# Patient Record
Sex: FEMALE | Race: WHITE | NOT HISPANIC OR LATINO | URBAN - METROPOLITAN AREA
[De-identification: names, ages, dates, MRNs, and addresses within clinical notes are randomized per-mention and may not be internally consistent; named-entity substitution may affect disease eponyms.]

---

## 2017-01-25 ENCOUNTER — COMMUNICATION - HEALTHEAST (OUTPATIENT)
Dept: FAMILY MEDICINE | Facility: CLINIC | Age: 62
End: 2017-01-25

## 2017-07-13 ENCOUNTER — COMMUNICATION - HEALTHEAST (OUTPATIENT)
Dept: FAMILY MEDICINE | Facility: CLINIC | Age: 62
End: 2017-07-13

## 2017-07-14 ENCOUNTER — OFFICE VISIT - HEALTHEAST (OUTPATIENT)
Dept: FAMILY MEDICINE | Facility: CLINIC | Age: 62
End: 2017-07-14

## 2017-07-14 DIAGNOSIS — J32.9 CHRONIC SINUSITIS: ICD-10-CM

## 2017-07-14 DIAGNOSIS — R42 VERTIGO: ICD-10-CM

## 2017-07-14 DIAGNOSIS — E03.9 HYPOTHYROIDISM, UNSPECIFIED TYPE: ICD-10-CM

## 2017-07-14 DIAGNOSIS — E03.9 HYPOTHYROIDISM: ICD-10-CM

## 2017-07-14 ASSESSMENT — MIFFLIN-ST. JEOR: SCORE: 1208.13

## 2017-07-21 ENCOUNTER — OFFICE VISIT - HEALTHEAST (OUTPATIENT)
Dept: OCCUPATIONAL THERAPY | Facility: REHABILITATION | Age: 62
End: 2017-07-21

## 2017-07-21 DIAGNOSIS — R26.81 UNSTEADINESS ON FEET: ICD-10-CM

## 2017-07-21 DIAGNOSIS — H81.12 BPPV (BENIGN PAROXYSMAL POSITIONAL VERTIGO), LEFT: ICD-10-CM

## 2017-07-21 DIAGNOSIS — R42 DIZZINESS: ICD-10-CM

## 2017-08-01 ENCOUNTER — OFFICE VISIT - HEALTHEAST (OUTPATIENT)
Dept: OCCUPATIONAL THERAPY | Facility: REHABILITATION | Age: 62
End: 2017-08-01

## 2017-08-01 DIAGNOSIS — H81.12 BPPV (BENIGN PAROXYSMAL POSITIONAL VERTIGO), LEFT: ICD-10-CM

## 2017-08-01 DIAGNOSIS — R42 DIZZINESS: ICD-10-CM

## 2017-08-01 DIAGNOSIS — R26.81 UNSTEADINESS ON FEET: ICD-10-CM

## 2017-08-17 ENCOUNTER — OFFICE VISIT - HEALTHEAST (OUTPATIENT)
Dept: OCCUPATIONAL THERAPY | Facility: REHABILITATION | Age: 62
End: 2017-08-17

## 2017-08-17 DIAGNOSIS — H81.13 BPPV (BENIGN PAROXYSMAL POSITIONAL VERTIGO), BILATERAL: ICD-10-CM

## 2017-08-17 DIAGNOSIS — R26.81 UNSTEADINESS ON FEET: ICD-10-CM

## 2017-08-17 DIAGNOSIS — R42 DIZZINESS: ICD-10-CM

## 2017-08-27 ENCOUNTER — COMMUNICATION - HEALTHEAST (OUTPATIENT)
Dept: FAMILY MEDICINE | Facility: CLINIC | Age: 62
End: 2017-08-27

## 2017-08-27 DIAGNOSIS — E03.9 HYPOTHYROIDISM: ICD-10-CM

## 2018-02-07 ENCOUNTER — COMMUNICATION - HEALTHEAST (OUTPATIENT)
Dept: FAMILY MEDICINE | Facility: CLINIC | Age: 63
End: 2018-02-07

## 2018-04-09 ENCOUNTER — OFFICE VISIT - HEALTHEAST (OUTPATIENT)
Dept: FAMILY MEDICINE | Facility: CLINIC | Age: 63
End: 2018-04-09

## 2018-04-09 DIAGNOSIS — E03.9 HYPOTHYROIDISM, UNSPECIFIED TYPE: ICD-10-CM

## 2018-04-09 DIAGNOSIS — J02.9 SORE THROAT: ICD-10-CM

## 2018-04-09 DIAGNOSIS — Z12.31 VISIT FOR SCREENING MAMMOGRAM: ICD-10-CM

## 2018-04-09 DIAGNOSIS — J40 BRONCHITIS: ICD-10-CM

## 2018-04-09 LAB — DEPRECATED S PYO AG THROAT QL EIA: NORMAL

## 2018-04-09 ASSESSMENT — MIFFLIN-ST. JEOR: SCORE: 1245.26

## 2018-04-10 LAB
GROUP A STREP BY PCR: NORMAL
TSH SERPL DL<=0.005 MIU/L-ACNC: 3.25 UIU/ML (ref 0.3–5)

## 2018-04-11 ENCOUNTER — AMBULATORY - HEALTHEAST (OUTPATIENT)
Dept: FAMILY MEDICINE | Facility: CLINIC | Age: 63
End: 2018-04-11

## 2018-04-12 ENCOUNTER — COMMUNICATION - HEALTHEAST (OUTPATIENT)
Dept: FAMILY MEDICINE | Facility: CLINIC | Age: 63
End: 2018-04-12

## 2018-04-13 RX ORDER — VALACYCLOVIR HYDROCHLORIDE 1 G/1
TABLET, FILM COATED ORAL
Qty: 4 TABLET | Refills: 0 | Status: SHIPPED | OUTPATIENT
Start: 2018-04-13

## 2019-05-06 ENCOUNTER — COMMUNICATION - HEALTHEAST (OUTPATIENT)
Dept: FAMILY MEDICINE | Facility: CLINIC | Age: 64
End: 2019-05-06

## 2019-05-11 ENCOUNTER — COMMUNICATION - HEALTHEAST (OUTPATIENT)
Dept: FAMILY MEDICINE | Facility: CLINIC | Age: 64
End: 2019-05-11

## 2019-05-11 DIAGNOSIS — E03.9 HYPOTHYROIDISM, UNSPECIFIED TYPE: ICD-10-CM

## 2019-05-24 ENCOUNTER — COMMUNICATION - HEALTHEAST (OUTPATIENT)
Dept: FAMILY MEDICINE | Facility: CLINIC | Age: 64
End: 2019-05-24

## 2019-05-29 ENCOUNTER — OFFICE VISIT - HEALTHEAST (OUTPATIENT)
Dept: FAMILY MEDICINE | Facility: CLINIC | Age: 64
End: 2019-05-29

## 2019-05-29 DIAGNOSIS — Z12.31 VISIT FOR SCREENING MAMMOGRAM: ICD-10-CM

## 2019-05-29 DIAGNOSIS — Z00.00 ROUTINE GENERAL MEDICAL EXAMINATION AT A HEALTH CARE FACILITY: ICD-10-CM

## 2019-05-29 DIAGNOSIS — Z13.220 LIPID SCREENING: ICD-10-CM

## 2019-05-29 DIAGNOSIS — E03.9 HYPOTHYROIDISM, UNSPECIFIED TYPE: ICD-10-CM

## 2019-05-29 DIAGNOSIS — Z91.89 DES EXPOSURE IN UTERO: ICD-10-CM

## 2019-05-29 DIAGNOSIS — Z13.1 SCREENING FOR DIABETES MELLITUS: ICD-10-CM

## 2019-05-29 RX ORDER — LEVOTHYROXINE SODIUM 88 UG/1
TABLET ORAL
Qty: 90 TABLET | Refills: 3 | Status: SHIPPED | OUTPATIENT
Start: 2019-05-29

## 2019-05-29 RX ORDER — PIMECROLIMUS 10 MG/G
CREAM TOPICAL
Refills: 3 | Status: SHIPPED | COMMUNITY
Start: 2019-05-06

## 2019-05-29 ASSESSMENT — MIFFLIN-ST. JEOR: SCORE: 1196.79

## 2021-05-28 NOTE — TELEPHONE ENCOUNTER
LM for pt to call clinic back.    Pt is due for a physical and needs to schedule prior to the release of the prescription, per Dr. Greenberg.

## 2021-05-28 NOTE — TELEPHONE ENCOUNTER
Left 2nd message for patient to call back:  on main line to schedule her physical and pap.     Information to relay to patient: Please assist Pt in scheduling a physical / PAP with Dr. Heidy Greenberg.

## 2021-05-28 NOTE — TELEPHONE ENCOUNTER
RN cannot approve Refill Request    RN can NOT refill this medication PCP messaged that patient is overdue for Labs and Office Visit. Last office visit: 4/9/2018 Thomas Flores DO Last Physical: Visit date not found Last MTM visit: Visit date not found Last visit same specialty: 4/9/2018 Thomas Flores DO.  Next visit within 3 mo: Visit date not found  Next physical within 3 mo: Visit date not found      Kami Carranza, [facility] Triage/Med Refill 5/12/2019    Requested Prescriptions   Pending Prescriptions Disp Refills     levothyroxine (SYNTHROID, LEVOTHROID) 88 MCG tablet [Pharmacy Med Name: LEVOTHYROXINE 0.088MG (88MCG) TAB] 90 tablet 0     Sig: TAKE 1 TABLET(88 MCG) BY MOUTH DAILY       Thyroid Hormones Protocol Failed - 5/11/2019  3:32 AM        Failed - Provider visit in past 12 months or next 3 months     Last office visit with prescriber/PCP: 4/9/2018 Thomas Flores DO OR same dept: Visit date not found OR same specialty: 4/9/2018 Thomas Flores DO  Last physical: Visit date not found Last MTM visit: Visit date not found   Next visit within 3 mo: Visit date not found  Next physical within 3 mo: Visit date not found  Prescriber OR PCP: Thomas Ramírez DO  Last diagnosis associated with med order: There are no diagnoses linked to this encounter.  If protocol passes may refill for 12 months if within 3 months of last provider visit (or a total of 15 months).             Failed - TSH on file in past 12 months for patient age 12 & older     TSH   Date Value Ref Range Status   04/09/2018 3.25 0.30 - 5.00 uIU/mL Final

## 2021-05-28 NOTE — TELEPHONE ENCOUNTER
Left message to call back for: Lorenza     Information to relay to patient: Please assist pt in scheduling a physical with PAP.

## 2021-05-29 NOTE — PROGRESS NOTES
Assessment/Plan:     1. Routine general medical examination at a health care facility  Routine history and physical, updated in EMR.  Patient is not fasting today, wishes to return fasting for labs as below.  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  She will consider updating immunizations, will return for shingles vaccine after she checks with her insurance.  She will schedule her mammogram and colonoscopy.  Plan repeat physical in 1 year.    2. Hypothyroidism, unspecified type  Patient will return in 4 weeks for recheck of TSH.  She has been noncompliant recently with her medication.  - levothyroxine (SYNTHROID, LEVOTHROID) 88 MCG tablet; TAKE 1 TABLET(88 MCG) BY MOUTH DAILY  Dispense: 90 tablet; Refill: 3  - Thyroid Stimulating Hormone (TSH); Future    3. ANNALISE exposure in utero  Recommended continuing care with OB/GYN for routine screening Pap smears.    4. Lipid screening  Fasting lipid profile will be updated when patient returns.  - Lipid Cascade FASTING; Future    5. Screening for diabetes mellitus  Fasting glucose ordered for future.  Patient will return for this.  - Glucose; Future    6. Visit for screening mammogram  Patient was given information to schedule mammogram.  - Mammo Screening Bilateral; Future      Subjective:      Lorenza Jewell is a 64 y.o. female who presents for an annual exam. The patient is sexually active. The patient participates in regular exercise: yes. The patient reports that there is not domestic violence in her life.  Patient has been feeling well overall and has no particular questions or concerns today.  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  She has been getting [facility] with [facility] Holm.  She is due for refill of her levothyroxine but admits that she did not take this over the weekend, missed about 3 doses.    Healthy Habits:   Regular Exercise: Yes - Pilates, walks dog  Sunscreen Use: Yes  Healthy  "Diet: Yes  Dental Visits Regularly: Yes  Seat Belt: Yes  Sexually active: Yes  Self Breast Exam Monthly:No   Colonoscopy: per pt 5 year ago. Pt will get next one scheduled  Lipid Profile: No  Glucose Screen: No  Prevention of Osteoporosis: No  Last Dexa: Yes, \"a long time ago\"      Immunization History   Administered Date(s) Administered     Influenza, Seasonal, Inj PF IIV3 11/16/2007, 10/03/2013, 10/30/2016, 11/03/2017     Influenza, inj, historic,unspecified 10/03/2013, 10/24/2014     Influenza,seasonal quad, PF, 36+MOS 11/03/2015     Td,adult,historic,unspecified 01/01/2005     Immunization status: missing doses of Tdap and Shingrix.    No exam data present    Gynecologic History  No LMP recorded. Patient is postmenopausal.  Contraception: post menopausal status  Last Pap: unknown, >5 years ago per patient. Results were: normal  Last mammogram: unsure. Results were: normal      OB History   No data available       Current Outpatient Medications   Medication Sig Dispense Refill     FEXOFENADINE HCL (MUCINEX ALLERGY ORAL) Take by mouth daily.       levothyroxine (SYNTHROID, LEVOTHROID) 88 MCG tablet TAKE 1 TABLET(88 MCG) BY MOUTH DAILY 90 tablet 0     pimecrolimus (ELIDEL) 1 % cream LOKI TO FACE BID  3     valACYclovir (VALTREX) 1000 MG tablet TAKE 2 TABLET BY MOUTH TWICE DAILY 4 tablet 0     No current facility-administered medications for this visit.      No past medical history on file.  Past Surgical History:   Procedure Laterality Date     NE MYOMECTOMY 1-4,W/TOT 250GMS/<,ABD APPRCH      Description: Uterine Myomectomy;  Recorded: 10/04/2013;     Patient has no known allergies.  Family History   Problem Relation Age of Onset     No Medical Problems Mother      Prostate cancer Father      Skin cancer Father      Hypertension Father      Vertigo Father      Social History     Socioeconomic History     Marital status:      Spouse name: Not on file     Number of children: Not on file     Years of education: " "Not on file     Highest education level: Not on file   Occupational History     Not on file   Social Needs     Financial resource strain: Not on file     Food insecurity:     Worry: Not on file     Inability: Not on file     Transportation needs:     Medical: Not on file     Non-medical: Not on file   Tobacco Use     Smoking status: Never Smoker     Smokeless tobacco: Never Used   Substance and Sexual Activity     Alcohol use: Yes     Drug use: No     Sexual activity: Not on file   Lifestyle     Physical activity:     Days per week: Not on file     Minutes per session: Not on file     Stress: Not on file   Relationships     Social connections:     Talks on phone: Not on file     Gets together: Not on file     Attends [employer]ism service: Not on file     Active member of club or organization: Not on file     Attends meetings of clubs or organizations: Not on file     Relationship status: Not on file     Intimate partner violence:     Fear of current or ex partner: Not on file     Emotionally abused: Not on file     Physically abused: Not on file     Forced sexual activity: Not on file   Other Topics Concern     Not on file   Social History Narrative     Not on file       Review of Systems  General:  Denies problem  Eyes: Denies problem  Ears/Nose/Throat: Denies problem  Cardiovascular: Denies problem  Respiratory:  Denies problem  Gastrointestinal:  Denies problem  Genitourinary: Denies problem  Musculoskeletal:  Denies problem  Skin: Denies problem  Neurologic: Denies problem  Psychiatric: Denies problem  Endocrine: Denies problem  Heme/Lymphatic: Denies problem   Allergic/Immunologic: Denies problem        Objective:         Vitals:    05/29/19 0901   BP: 106/68   Pulse: 72   Resp: 16   Weight: 139 lb 4.8 oz (63.2 kg)   Height: 5' 6.2\" (1.681 m)     Body mass index is 22.35 kg/m .    Physical Exam:  General Appearance: Alert, cooperative, no distress, appears stated age  Head: Normocephalic, without obvious " abnormality, atraumatic  Eyes: PERRL, conjunctiva/corneas clear, EOM's intact  Ears: Normal TM's and external ear canals, both ears  Nose: Nares normal, septum midline, mucosa normal, no drainage  Throat: Lips, mucosa, and tongue normal; teeth and gums normal  Neck: Supple, symmetrical, trachea midline, no adenopathy; thyroid: not enlarged, symmetric, no tenderness/mass/nodules; no carotid bruit or JVD  Back: Symmetric, no curvature, ROM normal, no CVA tenderness  Lungs: Clear to auscultation bilaterally, respirations unlabored  Breasts: No breast masses, tenderness, asymmetry, or nipple discharge  Heart: Regular rate and rhythm, S1 and S2 normal, no murmur, rub, or gallop  Abdomen: Soft, non-tender, bowel sounds active all four quadrants, no masses, no organomegaly  Pelvic:Not examined  Extremities: Extremities normal, atraumatic, no cyanosis or edema  Skin: Skin color, texture, turgor normal, no rashes or lesions  Lymph nodes: Cervical, supraclavicular, and axillary nodes normal  Neurologic: Normal

## 2021-05-29 NOTE — TELEPHONE ENCOUNTER
Left message to call back for: Lorenza    Information to relay to patient: LMTCB    Please assist the patient with scheduling a physical when the patient calls back, the patient is overdue.  Thank you so much!    Catie CAAL CMA

## 2021-05-29 NOTE — TELEPHONE ENCOUNTER
2nd call    Left message to call back for: Lorenza      Information to relay to patient:  LMTCB    Please assist the patient with scheduling a physical when the patient calls back, the patient is overdue.  Thank you so much!    Catie CAAL CMA

## 2021-05-29 NOTE — TELEPHONE ENCOUNTER
Left 3rd message for patient to call back:  on main line to schedule her physical and pap.     Information to relay to patient: Please assist Pt in scheduling a physical / PAP with Dr. Heidy Greenberg.

## 2021-05-31 VITALS — HEIGHT: 66 IN | BODY MASS INDEX: 22.9 KG/M2 | WEIGHT: 142.5 LBS

## 2021-06-01 VITALS — HEIGHT: 68 IN | WEIGHT: 145.44 LBS | BODY MASS INDEX: 22.04 KG/M2

## 2021-06-03 VITALS — BODY MASS INDEX: 22.39 KG/M2 | HEIGHT: 66 IN | WEIGHT: 139.3 LBS

## 2021-06-11 NOTE — PROGRESS NOTES
Lorenza,    Thyroid is significantly elevated compared to before. I'm going to increase your Synthroid dose. Let's recheck thyroid test in about 6-8 weeks

## 2021-06-11 NOTE — PROGRESS NOTES
ASSESSMENT/ PLAN    1. Vertigo  In the setting of chronic sinusitis. [place] hallpike positive. Has an ongoing frontal headache. Most likely BPPV or vestibular migraine, chronic sinusitis can also contribute to this as well. I didn't appreciate central pathology for her complaints.  She appears otherwise well on examination today.  Will treat symptomatically with meclizine 3 times daily as needed and we will send her to the vestibular clinic for further therapy.  If not improving, can consider imaging of her sinuses as discussed below.  - meclizine (ANTIVERT) 12.5 mg tablet; Take 1 tablet (12.5 mg total) by mouth 3 (three) times a day as needed for dizziness or nausea.  Dispense: 30 tablet; Refill: 1  - Ambulatory referral to PT/OT    2. Chronic sinusitis  Reviewed PCP and ENT visit about a year ago.  I do not think she has any acute infectious symptoms at this point.  We will start her on the Flonase again for nasal congestion and facial pressure.  I will consider repeating CT sinuses and refer her back to ENT if vertigo and nasal congestions do not improve with conservative therapy.  - fluticasone (FLONASE) 50 mcg/actuation nasal spray; 1 spray into each nostril daily.  Dispense: 16 g; Refill: 0    3. Hypothyroidism  We will check thyroid enzyme today and refill Synthroid as appropriately.  - Thyroid Maynard    TSH significantly elevated compared to before. I will increase Synthroid dose from 88 to 100 MCG per day.  will recheck thyroid test in about 6-8 weeks.  Result was communicated with patient to my chart.      Mandy Hanks MD         SUBJECTIVE   Lorenza Jewell is a 62 y.o. old female with a past medical history of chronic sinusitis who presented to clinic today for further evaluation of dizziness and vertigo.  She reports that she always has a low level baseline vertigo but recently it exacerbated. Symptoms have been going on for 5 days, worse in the morning per patient's report but gets better throughout  "the day. She describes vertigo as spinning feeling, room is spinning clockwise. She hasn't fallen. She denies blurry vision/ringing in ears. She has tried multiple over-the-counter medications (dramamine, Nasal saline spray, allergy medication - Zyrtec) without any relief. Reports pain in the frontal region and also discomfort in her left ear that is worsening from baseline. Her dizziness is associated with a headache as well that has been intermittent since the onset of her dizziness.  She denies fever/chills. She reports nasal congestion and runny nose. She has a history of chronic frontal sinusitis and has seen ENT physician in the past for sinus disease.  Her last visit with ENT was about a year ago. Denies recent travel internationally. No one else is sick at home.     Review of Systems:  A 12 point comprehensive review of systems was negative except as noted in HPI.    Medical History  Active Ambulatory (Non-Hospital) Problems    Diagnosis     Chronic frontal sinusitis     Leiomyoma Of The Uterus     Hypothyroidism     Restless Legs Syndrome     Varicose Veins     Primary Vitiligo     Functional Murmur     History reviewed. No pertinent past medical history.    Surgical History  She  has a past surgical history that includes myomectomy 1-4,w/tot 250gms/<,abd apprch.    Social History  Reviewed, and she  reports that she has never smoked. She has never used smokeless tobacco. She reports that she drinks alcohol. She reports that she does not use illicit drugs.    Family History  Reviewed, and family history includes Hypertension in her father; No Medical Problems in her mother; Prostate cancer in her father; Skin cancer in her father; Vertigo in her father.    Medications  Reviewed and reconciled.      Allergies  No Known Allergies      OBJECTIVE  Physical Exam:  Vital signs: /62 (Patient Site: Right Arm, Patient Position: Sitting, Cuff Size: Adult Regular)  Pulse 64  Resp 16  Ht 5' 6\" (1.676 m)  Wt " 142 lb 8 oz (64.6 kg)  BMI 23 kg/m2  Weight: 142 lb 8 oz (64.6 kg)    General appearance: pleasant, appears stated age, cooperative and in no distress  Eyes: EOMs intact, PERRL, conjunctivae normal.   ENT: moist oral mucosa, posterior oropharynx normal, TMs normal.  Thyroid normal to palpation, no mass.   Lymph: no cervical/supraclavicular adenopathy  Respiratory: clear to auscultation bilaterally, good air movement throughout, no wheezing or crackles, speaking full sentences without difficulty  Cardiovascular: regular rate and rhythm, no murmur appreciated, no leg edema  Musculoskeletal: warm and well perfused, strong and symmetric dorsalis pedal pulses, strength 5/5 and equal bilaterally  Skin: no rashes  Neuro: alert oriented x 3, grossly normal otherwise, [place] hallpike positive bilaterally.   Psych: normal affect, appropriate conversation

## 2021-06-12 NOTE — PROGRESS NOTES
[facility] Daily Progress     Patient Name: Lorenza Jewell  Date: 2017  Visit #: 2  Referral Diagnosis: Vertigo  Referring provider: Heidy Greenberg MD  Visit Diagnosis:     ICD-10-CM    1. BPPV (benign paroxysmal positional vertigo), left H81.12    2. Dizziness R42    3. Unsteadiness on feet R26.81          Assessment:     Patient is appropriate to continue with skilled occupational therapy intervention, as indicated by initial plan of care.    Goal Status:  Patient will be able to walk: on uneven/inclined surfaces;without loss of balance;in 12 weeks  Patient will bend: to dress;to clean;to do yard work;without vertigo;without dizziness;without loss of balance;in 12 weeks  Patient able to perform bed mobility: without vertigo;without dizziness;in 12 weeks  Patient will turn head: without dizziness;for watching traffic;for conversation;in 12 weeks  Patient will look up / down: without dizziness;for drinking;for reading;in 12 weeks    Plan / Patient Education:     Continue with initial plan of care. Progress with home program as tolerated.    Subjective:     Pain Ratin    Objective:   Subjective progress relative to last visit:  Intensity of dizziness is:  less  Frequency of dizziness is: less  Duration of dizziness is: less  Balance is:  better    Positional Tests:  Hallpike Right:  Negative  Hallpike Left:  Abnormal - Nystagmus left torsional, up beating    Treatment Today: Treated with left  Epley maneuver X 2.   X1 viewing-60 seconds-continue  Targets-60 seconds-continue  VOR suppression-instructed  Gait with head motion-instructed  Normal surface eyes closed-instructed  TREATMENT MINUTES COMMENTS   Evaluation     Self-care/ Home management     Neuromuscular Re-education 10    Canalith repositioning procedure 15          Total 25    Blank areas are intentional and mean the treatment did not include these items.          Xenia Lopez  2017  10:05 AM

## 2021-06-12 NOTE — PROGRESS NOTES
[facility] Daily Progress     Patient Name: Lorenza Jewell  Date: 2017  Visit #: 3  Referral Diagnosis: Vertigo  Referring provider: Heidy Greenberg MD  Visit Diagnosis:     ICD-10-CM    1. BPPV (benign paroxysmal positional vertigo), bilateral H81.13    2. Dizziness R42    3. Unsteadiness on feet R26.81          Assessment:     Patient is appropriate to continue with skilled occupational therapy intervention, as indicated by initial plan of care.    Goal Status:  Patient will be able to walk: on uneven/inclined surfaces;without loss of balance;in 12 weeks  Patient will bend: to dress;to clean;to do yard work;without vertigo;without dizziness;without loss of balance;in 12 weeks  Patient able to perform bed mobility: without vertigo;without dizziness;in 12 weeks  Patient will turn head: without dizziness;for watching traffic;for conversation;in 12 weeks  Patient will look up / down: without dizziness;for drinking;for reading;in 12 weeks    Plan / Patient Education:     Continue with initial plan of care. Progress with home program as tolerated.    Subjective:     Pain Ratin    Objective:   Subjective progress relative to last visit:  Intensity of dizziness is:  less  Frequency of dizziness is: less  Duration of dizziness is: less  Balance is:  better    Positional Tests:  Hallpike Right:  Abnormal - Nystagmus right torsional, up beating, few beats  Hallpike Left:  Abnormal - Nystagmus left torsional, up beating, few beats  Head Roll Right: Negative  Head Roll Left:  mild dizziness    Treatment Today: Treated with bilateral Epley maneuver X 2.   X1 viewing-60 seconds-resume  Targets-60 seconds-discontinue  VOR suppression-discontinue  Gait with head motion-continue  Normal surface eyes closed-discontinue  Perturbed surface eyes closed-instructed  TREATMENT MINUTES COMMENTS   Evaluation     Self-care/ Home management     Neuromuscular Re-education 10    Canalith repositioning procedure 10           Total 20    Blank areas are intentional and mean the treatment did not include these items.          Xenia Lopez  8/17/2017  11:36- 1156 AM

## 2021-06-12 NOTE — PROGRESS NOTES
[facility]   Vestibular Initial Evaluation    Patient Name: Lorenza Jewell  Date of evaluation: 7/21/2017  Referral Diagnosis: Vertigo  Referring provider: Mandy Hanks MD  Visit Diagnosis:     ICD-10-CM    1. BPPV (benign paroxysmal positional vertigo), left H81.12    2. Dizziness R42    3. Unsteadiness on feet R26.81        Assessment:      Patient has questionable left [place] - Hallpike. Treated with left Epley maneuver X2 and instructed on VOR exercises.    Goals:  Patient will be able to walk: on uneven/inclined surfaces;without loss of balance;in 12 weeks  Patient will bend: to dress;to clean;to do yard work;without vertigo;without dizziness;without loss of balance;in 12 weeks  Patient able to perform bed mobility: without vertigo;without dizziness;in 12 weeks  Patient will turn head: without dizziness;for watching traffic;for conversation;in 12 weeks  Patient will look up / down: without dizziness;for drinking;for reading;in 12 weeks    Patient's expectations/goals are realistic.    Barriers to Learning or Achieving Goals:  No Barriers.       Plan / Patient Instructions:        Plan of Care:   Authorization / Certification Start Date: 07/21/17  Authorization / Certification End Date: 10/19/17  Authorization / Certification Number of Visits: 12  Communication with: Referral Source  Patient Related Instruction: Nature of Condition;Treatment plan and rationale;Basis of treatment;Expected outcome  Times per Week: 1  Number of Weeks: 12  Number of Visits: 12  Neuromuscular Reeducation: vestibular  Canolith Repostioning:      Plan for next visit: Progress HEP     Subjective:         History of Present Illness:    Lorenza is a 62 y.o. female who presents to therapy today with complaints of vertigo, nausea and unsteadiness. Patient had sudden onset of symptoms about 2 weeks ago. Symptoms are intermittent. She has history of similar symptoms about 10 years ago that resolved without therapy or treatment.  Patient denies ear pain. Patient denies hearing changes.    Pain Ratin    Functional limitations are described as occurring with:   balance, bending, bed mobility, head turns, looking up or down, stepping over curbs         Objective:      Note: Items left blank indicates the item was not performed or not indicated at the time of the evaluation.    Patient Outcome Measures: DHI 42     Vestibular Disorder Examination  1. BPPV (benign paroxysmal positional vertigo), left     2. Dizziness     3. Unsteadiness on feet       Precautions/Restrictions: None  Posture Observation:      General standing posture is normal.    ROM:  Not Tested    Strength: Not Tested    Functional Mobility: good      Oculomotor Assessment: Normal tracking and normal saccades    VOR Function: Poor    Positional Tests:  Hallpike Right:  Negative  Hallpike Left:  Abnormal - Nystagmus left torsional, up beating but only a few beats. No improvement after Epley maneuver.    Balance Assessment: Not tested    Treatment Today: Treated with left Epley maneuver X2 and instructed on VOR exercises.  X1 viewing-10 seconds-instructed  Targets-10 seconds-instructed  TREATMENT MINUTES COMMENTS   Evaluation 20    Self-care/ Home management     Neuromuscular Re-education 10    Canalith repositioning procedure 5          Total 35    Blank areas are intentional and mean the treatment did not include these items.     OT Evaluation Code: (Please list factors)   Comorbidities: hypothyroid  Profile/History Review: Brief    Need for eval modification: No    # Treatment options: Limited    Clinical Decision Making:  Low      Occupational Profile/ Medical and Therapy History and Comorbidities Occupational Performance Clinical Decision Making   (Complexity)   brief history with review of medical/therapy records related to the presenting problem.  No comorbidities 1-3 Performance deficits that result in activity limitations and/or participation restrictions.    No  Assessment Modification  Low complexity, which includes  problem-focused assessments, and consideration of a limited number of treatment options.      expanded review of medical/therapy records and additional review of physical, cognitive and psychosocial history.    May have comorbidities 3-5 Performance deficits that result in activity limitations and/or participation restrictions.    Minimal to moderate modification of assessment Moderate complexity, which includes analysis of data from detailed assessments, and consideration of several treatment options.         Review of medical/therapy records and extensive additional review of physical, cognitive and psychosocial history.  Comorbidities affect occupational performance 5 or more Performance deficits that result in activity limitations and/or participation restrictions.    Significant modification of assessment High complexity, analysis of  Occupational profile and data,  Comprehensive assessments, multiple treatment options.            Xenia Lopez  7/21/2017  11:21 AM

## 2021-06-13 NOTE — PROGRESS NOTES
[facility] Discharge Summary  Patient Name: Lorenza Jewell  Date: 9/26/2017  Referral Diagnosis:vertigo  Referring provider: Heidy Greenberg MD  Visit Diagnosis:   1. BPPV (benign paroxysmal positional vertigo), bilateral     2. Dizziness     3. Unsteadiness on feet         Goal status: Goals met  Patient will be able to walk: on uneven/inclined surfaces;without loss of balance;in 12 weeks  Patient will bend: to dress;to clean;to do yard work;without vertigo;without dizziness;without loss of balance;in 12 weeks  Patient able to perform bed mobility: without vertigo;without dizziness;in 12 weeks  Patient will turn head: without dizziness;for watching traffic;for conversation;in 12 weeks  Patient will look up / down: without dizziness;for drinking;for reading;in 12 weeks    Patient was seen for 3 visits between 7-21-17 and 8-17-17.    Therapy will be discontinued at this time.  The patient will need a new referral to resume.    Thank you for your referral.  Xenia Lopez  9/26/2017  10:35 AM

## 2021-06-16 PROBLEM — Z91.89 DES EXPOSURE IN UTERO: Status: ACTIVE | Noted: 2019-05-29

## 2021-06-17 NOTE — PROGRESS NOTES
"Assessment / Impression     1. Bronchitis     2. Sore throat  Rapid Strep A Screen-Throat    Group A Strep, RNA Direct Detection, Throat   3. Hypothyroidism, unspecified type  levothyroxine (SYNTHROID, LEVOTHROID) 100 MCG tablet   4. Visit for screening mammogram  Mammo Screening Bilateral         Plan:     She was given a prescription for Z-Rigo.  I encouraged rest and hydration.  If her symptoms do not improve she may return to the clinic.  Her rapid strep test is negative.  This will be sent for culture.  I recommended that we check a TSH today.  If her results are completely normal we will consider continuing the levothyroxine 88 mcg.  That being said, her TSH last summer was elevated on that dose and if her TSH is elevated we will switch her back to 100 mcg daily.    Subjective:      HPI: Lorenza Jewell is a 62 y.o. female who presents to the clinic to discuss a couple of concerns.  She describes having worsening chest congestion and coughing symptoms over the past couple of weeks.  She denies fevers, shortness of breath or wheezing symptoms.  She has a history of chronic cough, but her symptoms feel more acute over the past couple of weeks.  She has been taking Mucinex.    She has a history of hypothyroidism and needs a refill of levothyroxine.  Last July her TSH level was elevated at 14.98 and her levothyroxine was increased from 88 mcg to 100 mcg.  About 6 weeks ago she ran out of the 100 mcg dose and started taking left over 88 mcg tablets.  She has not noticed any significant change in her energy level.      Review of Systems  Pertinent items are noted in HPI.       Objective:     /60 (Patient Site: Left Arm, Patient Position: Sitting, Cuff Size: Adult Regular)  Pulse 72  Temp 98.3  F (36.8  C) (Oral)   Resp 16  Ht 5' 7.5\" (1.715 m)  Wt 145 lb 7 oz (66 kg)  SpO2 98%  BMI 22.44 kg/m2    General Appearance: Alert, cooperative, appears slightly fatigued.  Head: Normocephalic, without obvious " abnormality, atraumatic.  Sinuses nontender to palpation  Eyes: PERRL, conjunctiva/corneas clear, EOM's intact.  Ears: Normal TM's and external ear canals, both ears.  Throat: Slightly erythematous. No exudates.  Neck: Supple, symmetrical, trachea midline, no lymphadenopathy.  Lungs: Clear to auscultation bilaterally, respirations unlabored.  No wheezing  Heart:: Regular rate and rhythm.  Extremities: Extremities normal, atraumatic, no cyanosis or edema.  Neuro: Deep tendon reflexes 2 out of 4 bilaterally.      Recent Results (from the past 168 hour(s))   Rapid Strep A Screen-Throat   Result Value Ref Range    Rapid Strep A Antigen No Group A Strep detected, presumptive negative No Group A Strep detected, presumptive negative

## 2021-07-03 NOTE — ADDENDUM NOTE
Addendum Note by Mandy Hanks MD at 7/14/2017  3:59 PM     Author: Mandy Hanks MD Service: -- Author Type: Physician    Filed: 7/14/2017  3:59 PM Encounter Date: 7/14/2017 Status: Signed    : Mandy Hanks MD (Physician)    Addended by: MANDY HANKS on: 7/14/2017 03:59 PM        Modules accepted: Orders

## 2021-08-21 ENCOUNTER — HEALTH MAINTENANCE LETTER (OUTPATIENT)
Age: 66
End: 2021-08-21

## 2021-10-16 ENCOUNTER — HEALTH MAINTENANCE LETTER (OUTPATIENT)
Age: 66
End: 2021-10-16

## 2022-03-11 PROCEDURE — 88305 TISSUE EXAM BY PATHOLOGIST: CPT | Mod: 26 | Performed by: PATHOLOGY

## 2022-03-11 PROCEDURE — 88305 TISSUE EXAM BY PATHOLOGIST: CPT | Mod: TC,ORL | Performed by: COLON & RECTAL SURGERY

## 2022-03-14 ENCOUNTER — LAB REQUISITION (OUTPATIENT)
Dept: LAB | Facility: CLINIC | Age: 67
End: 2022-03-14
Payer: COMMERCIAL

## 2022-03-16 LAB
PATH REPORT.COMMENTS IMP SPEC: NORMAL
PATH REPORT.COMMENTS IMP SPEC: NORMAL
PATH REPORT.FINAL DX SPEC: NORMAL
PATH REPORT.GROSS SPEC: NORMAL
PATH REPORT.MICROSCOPIC SPEC OTHER STN: NORMAL
PATH REPORT.RELEVANT HX SPEC: NORMAL
PHOTO IMAGE: NORMAL

## 2022-04-02 ENCOUNTER — HEALTH MAINTENANCE LETTER (OUTPATIENT)
Age: 67
End: 2022-04-02

## 2022-10-01 ENCOUNTER — HEALTH MAINTENANCE LETTER (OUTPATIENT)
Age: 67
End: 2022-10-01

## 2023-05-14 ENCOUNTER — HEALTH MAINTENANCE LETTER (OUTPATIENT)
Age: 68
End: 2023-05-14

## 2023-08-06 ENCOUNTER — HEALTH MAINTENANCE LETTER (OUTPATIENT)
Age: 68
End: 2023-08-06

## 2025-03-19 ENCOUNTER — HOSPITAL ENCOUNTER (OUTPATIENT)
Facility: AMBULATORY SURGERY CENTER | Age: 70
End: 2025-03-19
Attending: COLON & RECTAL SURGERY
Payer: COMMERCIAL